# Patient Record
Sex: FEMALE
[De-identification: names, ages, dates, MRNs, and addresses within clinical notes are randomized per-mention and may not be internally consistent; named-entity substitution may affect disease eponyms.]

---

## 2018-02-07 ENCOUNTER — HOSPITAL ENCOUNTER (INPATIENT)
Dept: HOSPITAL 42 - ED | Age: 23
LOS: 2 days | Discharge: HOME | DRG: 387 | End: 2018-02-09
Attending: INTERNAL MEDICINE | Admitting: INTERNAL MEDICINE
Payer: OTHER GOVERNMENT

## 2018-02-07 VITALS — BODY MASS INDEX: 20.7 KG/M2

## 2018-02-07 DIAGNOSIS — D50.9: ICD-10-CM

## 2018-02-07 DIAGNOSIS — Z86.19: ICD-10-CM

## 2018-02-07 DIAGNOSIS — K51.90: Primary | ICD-10-CM

## 2018-02-07 DIAGNOSIS — E03.9: ICD-10-CM

## 2018-02-07 DIAGNOSIS — N83.202: ICD-10-CM

## 2018-02-07 DIAGNOSIS — N83.201: ICD-10-CM

## 2018-02-07 DIAGNOSIS — K64.9: ICD-10-CM

## 2018-02-07 LAB
ALBUMIN SERPL-MCNC: 4.5 G/DL (ref 3–4.8)
ALBUMIN/GLOB SERPL: 1.2 {RATIO} (ref 1.1–1.8)
ALT SERPL-CCNC: 32 U/L (ref 7–56)
APPEARANCE UR: CLEAR
APTT BLD: 22.7 SECONDS (ref 25.1–36.5)
AST SERPL-CCNC: 24 U/L (ref 14–36)
BASOPHILS # BLD AUTO: 0.06 K/MM3 (ref 0–2)
BASOPHILS NFR BLD: 0.8 % (ref 0–3)
BILIRUB UR-MCNC: NEGATIVE MG/DL
BUN SERPL-MCNC: 13 MG/DL (ref 7–21)
CALCIUM SERPL-MCNC: 9.6 MG/DL (ref 8.4–10.5)
COLOR UR: YELLOW
EOSINOPHIL # BLD: 0.6 10*3/UL (ref 0–0.7)
EOSINOPHIL NFR BLD: 7.5 % (ref 1.5–5)
ERYTHROCYTE [DISTWIDTH] IN BLOOD BY AUTOMATED COUNT: 12.3 % (ref 11.5–14.5)
GFR NON-AFRICAN AMERICAN: > 60
GLUCOSE UR STRIP-MCNC: NEGATIVE MG/DL
GRANULOCYTES # BLD: 4.33 10*3/UL (ref 1.4–6.5)
GRANULOCYTES NFR BLD: 55.9 % (ref 50–68)
HGB BLD-MCNC: 13 G/DL (ref 12–16)
INR PPP: 1.04 (ref 0.93–1.08)
LEUKOCYTE ESTERASE UR-ACNC: NEGATIVE LEU/UL
LIPASE SERPL-CCNC: 113 U/L (ref 23–300)
LYMPHOCYTES # BLD: 1.9 10*3/UL (ref 1.2–3.4)
LYMPHOCYTES NFR BLD AUTO: 24.5 % (ref 22–35)
MCH RBC QN AUTO: 29.3 PG (ref 25–35)
MCHC RBC AUTO-ENTMCNC: 32.7 G/DL (ref 31–37)
MCV RBC AUTO: 89.8 FL (ref 80–105)
MONOCYTES # BLD AUTO: 0.9 10*3/UL (ref 0.1–0.6)
MONOCYTES NFR BLD: 11.3 % (ref 1–6)
PH UR STRIP: 6.5 [PH] (ref 4.7–8)
PLATELET # BLD: 354 10^3/UL (ref 120–450)
PMV BLD AUTO: 8.9 FL (ref 7–11)
PROT UR STRIP-MCNC: NEGATIVE MG/DL
PROTHROMBIN TIME: 12 SECONDS (ref 9.4–12.5)
RBC # BLD AUTO: 4.43 10^6/UL (ref 3.5–6.1)
RBC # UR STRIP: NEGATIVE /UL
SP GR UR STRIP: 1.01 (ref 1–1.03)
TROPONIN I SERPL-MCNC: < 0.01 NG/ML
URINE NITRATE: NEGATIVE
UROBILINOGEN UR STRIP-ACNC: 0.2 E.U./DL
WBC # BLD AUTO: 7.7 10^3/UL (ref 4.5–11)

## 2018-02-07 NOTE — CP.PCM.CON
<Kendy Snowden - Last Filed: 02/07/18 16:20>





History of Present Illness





- History of Present Illness


History of Present Illness: 


Gi consult note for Dr Chu





Reason for consult: colitis 





Patient is a 21 y/o female with PMHx of ulcerative colitis diagnosed based on 

colonoscopy back in 2009 presenting with worsening in bloody diarrhea, and 

abdominal pain. GI is being consulted for possible ulcerative colitis flare. 

Patient states since diagnosed she has been on mesalamine po and suppository 

with resolution in bloody bowel movement, but continued to have loose stool. 

Patient states she stopped the mesalamine 6 months ago. Patient currently only 

takes probiotics. Had colonoscopy 3 times after diagnoses with last one being 

in 2014 with unremarkable result. Patient moved from [place] and started 

seeing Dr Wiley recently due to colitis flare.


In addition, patient was diagnosed with C difficile twice back in 2012 and 2014 

and was treated with po flagyl  and vancomycin ( 4x per day for a month). 


Patient decided to come to the ED because for the past couple days patient has 

been having bloody bowel movement , feeling more fatigued and has been 

nauseous. Patient states she has been having fever on/off upward to 101-102.


Currently patient denies nausea, or vomiting. Have not had diarrhea since being 

in the ED.  Denies cough or runny nose. 





PMHx: ulcerative colitis, C difficile


PSHx: EGD and colonoscopy in 2009, 2012 and 2014 in [place]


FMHx: mom and dad are alive and healthy


Social: Denies tobacco, alcohol or illicit drug use. 


Home meds: probiotic, iron


Allergy: codeine





Review of Systems





- Review of Systems


All systems: reviewed and no additional remarkable complaints except


Review of Systems: 





12 point ROS reviewed, all negative except as per HPI. 





Past Patient History





- Past Social History


Smoking Status: Never Smoked


Alcohol: None


Drugs: Denies


Home Situation {Lives}: With Family





- CARDIAC


Hx Cardiac Disorders: No





- PULMONARY


Hx Respiratory Disorders: No





- NEUROLOGICAL


Hx Neurological Disorder: No





- HEENT


Hx HEENT Problems: No





- RENAL


Hx Chronic Kidney Disease: No





- ENDOCRINE/METABOLIC


Hx Endocrine Disorders: No





- HEMATOLOGICAL/ONCOLOGICAL


Hx Blood Disorders: Yes


Hx Anemia: Yes





- INTEGUMENTARY


Hx Dermatological Problems: No





- MUSCULOSKELETAL/RHEUMATOLOGICAL


Hx Musculoskeletal Disorders: No





- GASTROINTESTINAL


Hx Gastrointestinal Disorders: Yes


Hx Colitis: Yes





- GENITOURINARY/GYNECOLOGICAL


Hx Genitourinary Disorders: No





- PSYCHIATRIC


Hx Psychophysiologic Disorder: No


Hx Substance Use: No





- SURGICAL HISTORY


Hx Surgeries: No





Meds


Allergies/Adverse Reactions: 


 Allergies











Allergy/AdvReac Type Severity Reaction Status Date / Time


 


codeine AdvReac  VOMITING Verified 02/07/18 17:07














- Medications


Medications: 


 Current Medications





Hydromorphone HCl (Dilaudid)  0.5 mg IVP Q4H PRN


   PRN Reason: Pain, Mild (1-3)


Pantoprazole Sodium (Protonix Inj)  40 mg IVP DAILY [facility]











Physical Exam





- Constitutional


Appears: No Acute Distress





- Head Exam


Head Exam: ATRAUMATIC, NORMAL INSPECTION, NORMOCEPHALIC





- Eye Exam


Eye Exam: EOMI, Normal appearance, PERRL.  absent: Scleral icterus


Pupil Exam: NORMAL ACCOMODATION





- ENT Exam


ENT Exam: Mucous Membranes Moist





- Neck Exam


Neck exam: Positive for: Normal Inspection.  Negative for: Lymphadenopathy





- Respiratory Exam


Respiratory Exam: Clear to Auscultation Bilateral, NORMAL BREATHING PATTERN.  

absent: Rales, Rhonchi, Wheezes, Respiratory Distress, Stridor





- Cardiovascular Exam


Cardiovascular Exam: REGULAR RHYTHM, RRR, +S1, +S2.  absent: Gallop, JVD, Rubs, 

Systolic Murmur





- GI/Abdominal Exam


GI & Abdominal Exam: Distended (left lower quadrant.), Normal Bowel Sounds, 

Soft.  absent: Firm, Guarding, Organomegaly, Rebound, Rigid, Tenderness





- Extremities Exam


Extremities exam: Positive for: normal inspection.  Negative for: pedal edema





- Back Exam


Back exam: NORMAL INSPECTION





- Neurological Exam


Neurological exam: Alert, Oriented x3





- Psychiatric Exam


Psychiatric exam: Normal Affect, Normal Mood





- Skin


Skin Exam: Dry, Intact, Normal Color, Warm





Results





- Vital Signs


Recent Vital Signs: 


 Last Vital Signs











Temp  98.4 F   02/07/18 09:21


 


Pulse  70   02/07/18 16:02


 


Resp  17   02/07/18 16:02


 


BP  127/65   02/07/18 16:02


 


Pulse Ox  99   02/07/18 16:02














- Labs


Result Diagrams: 


 02/07/18 11:05





 02/07/18 11:05





Assessment & Plan





- Assessment and Plan (Free Text)


Assessment: 


Patient is a 21 y/o female with PMHx of ulcerative colitis diagnosed and c diff 

colitis s/p treatment 2x presenting with worsening in abdominal pain, bloody 

bowel movement and loose stool, along with fever. CT with ovarian cysts.





- Likely ulcerative colitis flare, r/o c diff colitis 





Plan: 


- NPO past midnight


- Gi prep for colonoscopy tomorrow 


- IV hydration


- anti emetics prn


- Stool c diff for toxin and antigen testing


- stool culture. 





Patient seen, examined and case discussed with Dr Chu.





- Date & Time


Date: 02/07/18


Time: 16:10





<Matthias Chu - Last Filed: 02/07/18 18:45>





Meds





- Medications


Medications: 


 Current Medications





Hydromorphone HCl (Dilaudid)  0.5 mg IVP Q4H PRN


   PRN Reason: Pain, Mild (1-3)


Sodium Chloride (Sodium Chloride 0.9%)  1,000 mls @ 150 mls/hr IV .Q6H40M [facility]


   Last Admin: 02/07/18 17:13 Dose:  150 mls/hr


Ondansetron HCl (Zofran Inj)  4 mg IVP Q4H PRN


   PRN Reason: Nausea/Vomiting


Pantoprazole Sodium (Protonix Inj)  40 mg IVP DAILY [facility]











Results





- Vital Signs


Recent Vital Signs: 


 Last Vital Signs











Temp  98.4 F   02/07/18 18:04


 


Pulse  70   02/07/18 18:04


 


Resp  17   02/07/18 18:04


 


BP  127/65   02/07/18 18:04


 


Pulse Ox  99   02/07/18 16:02














- Labs


Result Diagrams: 


 02/07/18 11:05





 02/07/18 11:05





Attending/Attestation





- Attestation


I have personally seen and examined this patient.: Yes


I have fully participated in the care of the patient.: Yes


I have reviewed all pertinent clinical information: Yes


Notes (Text): 





02/07/18 18:44


22 year old female with h/o UC admitted with abdominal pain and bloody diarrhea.


1. Ulcerative colitis


-Plan:


-colonoscopy tomorrow for further eval


-prep tonight, npo after mn


-send stool for cultures/C.diff/O&P


-IV hydration

## 2018-02-07 NOTE — CT
PROCEDURE:  CT Abdomen and Pelvis with contrast



HISTORY:

h/o ulc colitis r/o abscess



COMPARISON:

None.



TECHNIQUE:

Contrast dose: 100 cc of Omni 350



Radiation dose:



Total exam DLP = 265 mGy-cm.



This CT exam was performed using one or more of the following dose 

reduction techniques: Automated exposure control, adjustment of the 

mA and/or kV according to patient size, and/or use of iterative 

reconstruction technique.



FINDINGS:



LOWER THORAX:

Unremarkable. 



LIVER:

Unremarkable. No gross lesion or ductal dilatation. 



GALLBLADDER AND BILE DUCTS:

Unremarkable. 



PANCREAS:

Unremarkable. No gross lesion or ductal dilatation.



SPLEEN:

Unremarkable. 



ADRENALS:

Unremarkable. No mass. 



KIDNEYS AND URETERS:

Unremarkable. No hydronephrosis. No solid mass. 



VASCULATURE:

Unremarkable. No aortic aneurysm. 



BOWEL:

Unremarkable. No obstruction. No gross mural thickening. 



APPENDIX:

Normal appendix. 



PERITONEUM:

Unremarkable. No free fluid. No free air. 



LYMPH NODES:

Unremarkable. No enlarged lymph nodes. 



BLADDER:

Unremarkable. 



REPRODUCTIVE:

Bilateral ovarian cysts are seen.  There is a thick-walled enhancing 

rim on the left which may represent a recent cyst rupture. There is 

minimal fluid in the cul-de-sac 



BONES:

No acute fracture. 



OTHER FINDINGS:

None.



IMPRESSION:

Bilateral ovarian cysts are seen.  There is a thick-walled enhancing 

rim on the left which may represent a recent cyst rupture. There is 

minimal fluid in the cul-de-sac

## 2018-02-07 NOTE — ED PDOC
Arrival/HPI





- General


Chief Complaint: GI Problem


Time Seen by Provider: 02/07/18 09:37


Historian: Patient, Partner





- History of Present Illness


Narrative History of Present Illness (Text): 


02/07/18 09:40


A 22 year old female, whose past medical history includes ulcer colitis, 

hemorrhoids, and anemia, presents to the emergency department complaining of 

intermittent abdominal pain and bloody stool. Patient reports experiencing 

symptoms for few weeks. States stools are sometimes completely bloody, 

sometimes normal. Patient mentions experiencing fever few weeks ago prior to 

symptoms. Notes also experiencing dizziness, lightheadedness, nausea, lower 

abdominal pain, shortness of breath (mostly when walking), and bad odor to 

urine. Denies any vomiting, rhinorrhea, cough, or any other complaints at this 

time. 





PMD: Dr. Green


GI: Dr. Mcneill





Past Medical History





- Provider Review


Nursing Documentation Reviewed: Yes





- Cardiac


Hx Cardiac Disorders: No





- Pulmonary


Hx Respiratory Disorders: No





- Neurological


Hx Neurological Disorder: No





- HEENT


Hx HEENT Disorder: No





- Renal


Hx Renal Disorder: No





- Endocrine/Metabolic


Hx Endocrine Disorders: No





- Hematological/Oncological


Hx Blood Disorders: Yes


Hx Anemia: Yes





- Integumentary


Hx Dermatological Disorder: No





- Musculoskeletal/Rheumatological


Hx Musculoskeletal Disorders: No





- Gastrointestinal


Hx Gastrointestinal Disorders: Yes


Hx Colitis: Yes





- Genitourinary/Gynecological


Hx Genitourinary Disorders: No





- Psychiatric


Hx Psychophysiologic Disorder: No


Hx Substance Use: No





Family/Social History





- Physician Review


Nursing Documentation Reviewed: Yes


Family/Social History: No Known Family HX


Smoking Status: Never Smoked


Hx Alcohol Use: Yes


Frequency of alcohol use: Socially


Hx Substance Use: No





Allergies/Home Meds


Allergies/Adverse Reactions: 


Allergies





codeine Adverse Reaction (Verified 02/07/18 17:07)


 VOMITING








Home Medications: 


 Home Meds











 Medication  Instructions  Recorded  Confirmed


 


No Known Home Med  02/07/18 02/07/18














Review of Systems





- Physician Review


All systems were reviewed & negative as marked: Yes





- Review of Systems


ENT: absent: Rhinorrhea


Respiratory: SOB (mostly when walking).  absent: Cough


Gastrointestinal: Abdominal Pain (intermittently), Stool Changes (bloody stool (

sometimes)), Nausea.  absent: Vomiting


Genitourinary Female: Other (bad odor to urine)


Neurological: Dizziness





Physical Exam


Vital Signs Reviewed: Yes


Vital Signs











  Temp Pulse Resp BP Pulse Ox


 


 02/07/18 16:02   70  17  127/65  99


 


 02/07/18 13:40   74  18  128/64  100


 


 02/07/18 12:37   75  18  132/65  100


 


 02/07/18 11:00   78  18  134/69  100


 


 02/07/18 10:35   93 H  17  118/62  99


 


 02/07/18 09:21  98.4 F  81  16  136/71  100











Temperature: Afebrile


Blood Pressure: Normal


Pulse: Regular


Respiratory Rate: Normal


Appearance: Positive for: Well-Appearing


Pain Distress: None


Mental Status: Positive for: Alert and Oriented X 3





- Systems Exam


Head: Present: Atraumatic, Normocephalic


Pupils: Present: PERRL


Extroacular Muscles: Present: EOMI


Conjunctiva: Present: Normal


Mouth: Present: Moist Mucous Membranes


Neck: Present: Normal Range of Motion


Respiratory/Chest: Present: Clear to Auscultation, Good Air Exchange.  No: 

Respiratory Distress, Accessory Muscle Use


Cardiovascular: Present: Regular Rate and Rhythm, Normal S1, S2.  No: Murmurs


Abdomen: Present: Tenderness (left side of abdomen, more so upper than lower 

region)


Rectal: Present: Hemorrhoids (external non-thrombus hemorrhoid), Other (brown 

stool, guaiac positive. Chaperoned by Deepa Antunez.)


Back: Present: Normal Inspection


Upper Extremity: Present: Normal Inspection.  No: Cyanosis, Edema


Lower Extremity: Present: Normal Inspection.  No: Edema


Neurological: Present: GCS=15, CN II-XII Intact, Speech Normal


Skin: Present: Pale


Psychiatric: Present: Alert, Oriented x 3, Normal Insight, Normal Concentration





Medical Decision Making


ED Course and Treatment: 


02/07/18 09:45


Impression: 22 year old female with intermittent abdominal pain and bloody 

stool. Physical exam shows tenderness to left abdomen (more so on upper than 

lower region); rectal exam, chaperoned by Deepa Antunez, shows guaiac 

positive, brown stool, external non-thrombus hemorrhoid..





Differential Diagnosis included but are not limited to:  Ulcer Colitis 

Exacerbation vs. Abscess





Plan:


-- Abd/Pelvis CT


-- Labs


-- Morphine


-- Zofran


-- Protonix


-- Urinalysis 


-- Reassess and disposition





Progress Notes:


02/07/2018 12:28


Abd/Pelvis CT


IMPRESSION: 


Bilateral ovarian cysts are seen. There is a thick-walled enhancing rim on the 

the left which may represent a recent cyst rupture. There is minimal fluid in 

the cul-de-sac.


Dictator: Hernan Sagastume MD





02/07/18 14:33


Case discussed with Dr. Bell and patient's GI doctor, Dr. Mcneill, who 

recommends patient be admitted for colonoscopy tomorrow.








- Lab Interpretations


Lab Results: 








 02/07/18 11:05 





 02/07/18 11:05 





 Lab Results





02/07/18 12:45: Blood Type Confirm A POSITIVE


02/07/18 11:05: Sodium 140, Potassium 4.2, Chloride 103, Carbon Dioxide 23, 

Anion Gap 18, BUN 13, Creatinine 0.7, Est GFR (African Amer) > 60, Est GFR (Non-

Af Amer) > 60, Random Glucose 87, Calcium 9.6, Total Bilirubin 0.8, AST 24, ALT 

32, Alkaline Phosphatase 96, Lactate Dehydrogenase 422, Total Creatine Kinase 56

, Troponin I < 0.01, Total Protein 8.3, Albumin 4.5, Globulin 3.8, Albumin/

Globulin Ratio 1.2, Lipase 113


02/07/18 11:05: Urine Color Yellow, Urine Appearance Clear, Urine pH 6.5, Ur 

Specific Gravity 1.010, Urine Protein Negative, Urine Glucose (UA) Negative, 

Urine Ketones Negative, Urine Blood Negative, Urine Nitrate Negative, Urine 

Bilirubin Negative, Urine Urobilinogen 0.2, Ur Leukocyte Esterase Negative


02/07/18 11:05: PT 12.0, INR 1.04, APTT 22.7 L


02/07/18 11:05: WBC 7.7, RBC 4.43, Hgb 13.0, Hct 39.8, MCV 89.8, MCH 29.3, MCHC 

32.7, RDW 12.3, Plt Count 354, MPV 8.9, Gran % 55.9, Lymph % (Auto) 24.5, Mono 

% (Auto) 11.3 H, Eos % (Auto) 7.5 H, Baso % (Auto) 0.8, Gran # 4.33, Lymph # (

Auto) 1.9, Mono # (Auto) 0.9 H, Eos # (Auto) 0.6, Baso # (Auto) 0.06


02/07/18 10:50: Blood Type A POSITIVE, Antibody Screen Negative, BBK History 

Checked No verified bt








I have reviewed the lab results: Yes





- RAD Interpretation


Radiology Orders: 








02/07/18 09:45


ABD PELVIS PO & IV CONTRAST [CT] Stat 














- Medication Orders


Current Medication Orders: 








Sodium Chloride (Sodium Chloride 0.9%)  1,000 mls @ 150 mls/hr IV .Q6H40M [facility]


   Last Admin: 02/07/18 17:13  Dose: 150 mls/hr





eMAR Start Stop


 Document     02/07/18 17:13  EP  (Rec: 02/07/18 17:14  EP  [facility] [id number])


     Intravenous Solution


      Start Date                                 02/07/18


      Start Time                                 17:13





Metronidazole (Flagyl)  500 mg in 100 mls @ 100 mls/hr IVPB Q8 [facility]


   PRN Reason: Protocol


Ceftriaxone Sodium (Rocephin 1 Gram Ivpb)  1 gm in 100 mls @ 100 mls/hr IVPB 

DAILY [facility]


   PRN Reason: Protocol


   Last Admin: 02/08/18 10:41  Dose: 100 mls/hr





eMAR Start Stop


 Document     02/08/18 10:41  CV  (Rec: 02/08/18 10:41  [place]  [id number]


     Intravenous Solution


      Start Date                                 02/08/18


      Start Time                                 10:41





Sodium Chloride (Sodium Chloride 0.9%)  1,000 mls @ 100 mls/hr IV .Q10H [facility]


Mesalamine (Rowasa Enema)  4 gm RC BID [facility]


Mesalamine (Asacol Hd 800mg)  1,600 mg PO TID [facility]


Ondansetron HCl (Zofran Inj)  4 mg IVP Q4H PRN


   PRN Reason: Nausea/Vomiting


Pantoprazole Sodium (Protonix Inj)  40 mg IVP DAILY [facility]


   Last Admin: 02/08/18 10:42  Dose: 40 mg





IVP Administration


 Document     02/08/18 10:42  CV  (Rec: 02/08/18 10:42  [place]  [id number]


     Charges for Administration


      # of IVP Administrations                   1








Discontinued Medications





Hydromorphone HCl (Dilaudid)  0.5 mg IVP Q4H PRN


   PRN Reason: Pain, Mild (1-3)


Morphine Sulfate (Morphine)  2 mg IVP STAT STA


   Stop: 02/07/18 09:47


   Last Admin: 02/07/18 10:40  Dose: 2 mg





MAR Pain Assessment


 Document     02/07/18 10:40  SF  (Rec: 02/07/18 10:51  SF  [id number]


     Pain Reassessment


      Is this a pain reassessment?               Yes


     Sleep


      Is patient sleeping during reassessment?   No


IVP Administration


 Document     02/07/18 10:40  SF  (Rec: 02/07/18 10:51  SF  [id number]


     Charges for Administration


      # of IVP Administrations                   1





Ondansetron HCl (Zofran Inj)  4 mg IVP STAT STA


   Stop: 02/07/18 09:45


   Last Admin: 02/07/18 10:40  Dose: 4 mg





IVP Administration


 Document     02/07/18 10:40  SF  (Rec: 02/07/18 10:51  SF  [id number])


     Charges for Administration


      # of IVP Administrations                   1





Pantoprazole Sodium (Protonix Inj)  80 mg IVP STAT STA


   Stop: 02/07/18 09:47


   Last Admin: 02/07/18 10:40  Dose: 80 mg





IVP Administration


 Document     02/07/18 10:40  SF  (Rec: 02/07/18 10:52  SF  [id number])


     Charges for Administration


      # of IVP Administrations                   1





Pneumococcal Polyvalent Vaccine (Pneumovax 23 Vaccine)  0.5 ml IM .ONCE ONE


   Stop: 02/07/18 18:26


Polyethylene Glycol/Electrolytes (Golytely)  4,000 ml PO ONCE ONE


   Stop: 02/07/18 16:18


   Last Admin: 02/07/18 17:14  Dose: 4,000 ml











- Scribe Statement


The provider has reviewed the documentation as recorded by the Deepa Juárez





Provider Scribe Attestation:


All medical record entries made by the Jonnyibcailin were at my direction and 

personally dictated by me. I have reviewed the chart and agree that the record 

accurately reflects my personal performance of the history, physical exam, 

medical decision making, and the department course for this patient. I have 

also personally directed, reviewed, and agree with the discharge instructions 

and disposition.








Disposition/Present on Arrival





- Present on Arrival


Any Indicators Present on Arrival: No


History of DVT/PE: No


History of Uncontrolled Diabetes: No


Urinary Catheter: No


History of Decub. Ulcer: No


History Surgical Site Infection Following: None





- Disposition


Have Diagnosis and Disposition been Completed?: Yes


Diagnosis: 


 Ulcerative colitis





Disposition: HOSPITALIZED


Disposition Time: 14:32


Patient Plan: Admission


Condition: FAIR

## 2018-02-08 VITALS — RESPIRATION RATE: 18 BRPM

## 2018-02-08 LAB
% IRON SATURATION: 17 % (ref 20–55)
ALBUMIN SERPL-MCNC: 3.7 G/DL (ref 3–4.8)
ALBUMIN/GLOB SERPL: 1.1 {RATIO} (ref 1.1–1.8)
ALT SERPL-CCNC: 23 U/L (ref 7–56)
AST SERPL-CCNC: 20 U/L (ref 14–36)
BUN SERPL-MCNC: 6 MG/DL (ref 7–21)
CALCIUM SERPL-MCNC: 8.4 MG/DL (ref 8.4–10.5)
ERYTHROCYTE [DISTWIDTH] IN BLOOD BY AUTOMATED COUNT: 12.4 % (ref 11.5–14.5)
FOLATE SERPL-MCNC: 7 NG/ML
GFR NON-AFRICAN AMERICAN: > 60
HDLC SERPL-MCNC: 39 MG/DL (ref 29–60)
HGB BLD-MCNC: 10.3 G/DL (ref 12–16)
IRON SERPL-MCNC: 56 UG/DL (ref 45–180)
LDLC SERPL-MCNC: 61 MG/DL (ref 0–129)
MCH RBC QN AUTO: 28 PG (ref 25–35)
MCHC RBC AUTO-ENTMCNC: 30.7 G/DL (ref 31–37)
MCV RBC AUTO: 91 FL (ref 80–105)
PLATELET # BLD: 285 10^3/UL (ref 120–450)
PMV BLD AUTO: 8.5 FL (ref 7–11)
RBC # BLD AUTO: 3.68 10^6/UL (ref 3.5–6.1)
TIBC SERPL-MCNC: 333 UG/DL (ref 265–497)
VIT B12 SERPL-MCNC: 498 PG/ML (ref 239–931)
WBC # BLD AUTO: 5.4 10^3/UL (ref 4.5–11)

## 2018-02-08 PROCEDURE — 0DBM8ZX EXCISION OF DESCENDING COLON, VIA NATURAL OR ARTIFICIAL OPENING ENDOSCOPIC, DIAGNOSTIC: ICD-10-PCS

## 2018-02-08 PROCEDURE — 0DBL8ZX EXCISION OF TRANSVERSE COLON, VIA NATURAL OR ARTIFICIAL OPENING ENDOSCOPIC, DIAGNOSTIC: ICD-10-PCS

## 2018-02-08 PROCEDURE — 0DBK8ZX EXCISION OF ASCENDING COLON, VIA NATURAL OR ARTIFICIAL OPENING ENDOSCOPIC, DIAGNOSTIC: ICD-10-PCS

## 2018-02-08 RX ADMIN — CEFTRIAXONE SCH MLS/HR: 1 INJECTION, SOLUTION INTRAVENOUS at 10:41

## 2018-02-08 RX ADMIN — METRONIDAZOLE SCH MLS/HR: 500 INJECTION, SOLUTION INTRAVENOUS at 14:55

## 2018-02-08 RX ADMIN — MESALAMINE SCH MG: 800 TABLET, DELAYED RELEASE ORAL at 14:55

## 2018-02-08 RX ADMIN — METRONIDAZOLE SCH MLS/HR: 500 INJECTION, SOLUTION INTRAVENOUS at 21:37

## 2018-02-08 RX ADMIN — MESALAMINE SCH MG: 800 TABLET, DELAYED RELEASE ORAL at 18:50

## 2018-02-08 NOTE — HP
CHIEF COMPLAINT:  Abdominal pain, feverish feeling, blood in the stool.



HISTORY OF PRESENT ILLNESS:  Ms. Mony Rodrigez is a 22-year-old female with

past medical history of ulcerative colitis, hemorrhoids, anemia, came to

the Emergency Department complaining of intermittent abdominal pain and

bloody stool.  The patient reports the pain and symptoms present for a few

weeks.  The patient's stools are sometimes completely bloody and sometimes

normal.  The patient manifests experience of fever a few weeks ago prior to

the symptoms, noticed fatigue, dizziness, lightheadedness, nausea, lower

abdominal pain, shortness of breath mostly when walking and bad odor in the

urine.  Denies nausea, vomiting, rhinorrhea, cough.



PAST MEDICAL HISTORY:  As above.  History of ulcerative colitis,

hemorrhoids, anemia, colitis.



FAMILY HISTORY:  Father and mother, noncontributory.



HABITS:  Never smoked.  No drug, no ethanol.



ALLERGIES:  WITH CODEINE.



HOME MEDICATIONS:  Unknown.



REVIEW OF SYSTEMS:  The patient seen and examined on the bedside in her

room, drinking _____, still has the abdominal pain.  No rhinorrhea. 

Shortness of breath when walking.  No coughing.  Abdominal pain is

intermittent.  Stool is bloody.  No nausea or vomiting now.  Bad odor of

the urine.  Dizziness.



PHYSICAL EXAMINATION:

VITAL SIGNS:  Temperature 98.4, pulse 81, respiratory rate 16, blood

pressure 138/71, pulse oximetry is 100.

HEENT:  Head is normocephalic and atraumatic.  Eyes:  PERRLA.  Extraocular

muscles are intact.  Conjunctivae are clear.  Nose is patent.  Mucous

membranes are moist.

NECK:  Supple.  No carotid bruits, JVD or thyromegaly.

CHEST:  Bilaterally symmetrical.

HEART:  S1 and S2 positive.

LUNGS:  Clear to auscultation.

ABDOMEN:  Soft.  Bowel sounds present.  No organomegaly.

EXTREMITIES:  No edema.  No cyanosis.

NEUROLOGIC:  The patient is awake and alert.  Moving all four extremities. 

No focal deficits.



LABORATORY DATA:  White blood cells 7.7, hemoglobin 13.0, hematocrit 39.8,

platelets 354.  Sodium 140, potassium 4.2, BUN 13, creatinine 0.7, glucose

87.



ASSESSMENT AND PLAN:  History of colitis, hemorrhoids, anemia, history of

Clostridium difficile toxin colitis, ulcerative colitis was diagnosed with

colonoscopy back in 2009, came with worsening blood in the diarrhea, rule

out ulcerative colitis flare up.  As per patient, the patient is on

mesalamine p.o. and suppositories with resolution in bloody bowel movement,

but continues to have loose stool.  Patient was told to stop mesalamine six

months ago.  Now, patient is only taking probiotic, had colonoscopy three

times after diagnosis with last one in 2014, which was unremarkable. 

Patient moved to [place], started seeing Dr. Mcneill recently due to

colitis flare up, seen by Dr. Matthias Chu.  Patient is going for

colonoscopy tomorrow, prep patient tonight, n.p.o. after midnight.  Send

the stool for culture, Clostridium difficile, ova and parasite, and give

intravenous hydration.  We will call Infectious Disease consult also for

feverish feeling with repeat labs.  Urinalysis is within normal limits, but

as per patient, there is bad smell in the urine.  Electrolytes are within

normal limits.  No anemia.  Gastrointestinal and deep vein thrombosis

prophylaxis.  Repeat albs.  We will follow up.





__________________________________________

Patricia Bell MD



DD:  02/07/2018 22:33:42

DT:  02/07/2018 23:47:58

Job # [id number]

## 2018-02-08 NOTE — PN
DATE:



SUBJECTIVE:  The patient is seen and examined on the bedside, looking

comfortable.  Went for colonoscopy by Dr. Chu.  No fever.  No chills.  No

nausea, vomiting, diarrhea.  No hematuria.  No hematochezia.  No swelling

of the leg.  No chest pain.  No palpitation.  No headache.  No dizziness.



PHYSICAL EXAMINATION:

VITAL SIGNS:  Temperature 97.6, pulse 91, blood pressure 105/63,

respiratory rate 18.

HEENT:  Head normocephalic, atraumatic.  Eyes PERRLA.  Extraocular muscles

intact.  Conjunctivae clear.  Nose patent.

NECK:  Supple.  No carotid bruit.  No JVD or thyromegaly.

CHEST:  Bilaterally symmetrical.

HEART:  S1 and S2 positive.

LUNGS:  Clear to auscultation.

ABDOMEN:  Soft.  Bowel sounds positive.  No organomegaly.

EXTREMITIES:  No edema.  No cyanosis.

NEUROLOGICAL:  The patient is awake and alert.  Moving all 4 extremities. 

No focal deficits.



LABORATORY DATA:  White blood cells 5.4, hemoglobin 10.3, hematocrit 33.5,

platelets 285.  Sodium 143, potassium 3.9, BUN 6, creatinine 0.6, glucose

89, iron saturation 17, TSH 5.44.



MEDICATIONS:  Asacol, Flagyl, Protonix, Rocephin, mesalamine NS, Zofran.



ASSESSMENT AND PLAN:  Ms. Mony Rodrigez, 22-year-old female with anemia,

iron deficiency, hypothyroidism, history obstructive colitis, Clostridium

difficile toxin colitis, came with gastrointestinal bleeding, went for

colonoscopy, history of abdominal pain and bloody diarrhea.  Rule out

ulcerative colitis flare-up.  The patient is also having feverish feeling. 

Infectious Disease consult called.  Started on antibiotics.  We also has

limited colonoscopy with biopsy.  Waiting for the findings.  We will

continue current treatment.  Repeat labs.  We will follow up.



__________________________________________

Patricia Bell MD





DD:  02/08/2018 21:41:05

DT:  02/08/2018 22:02:51

Job # [id number]

## 2018-02-08 NOTE — CP.PCM.PCO
Physician Communication Note





- Physician Communication Note


Physician Communication Note: went for colonoscopy, started abx, will see pt 

tomorrow

## 2018-02-09 VITALS
TEMPERATURE: 98.5 F | HEART RATE: 81 BPM | SYSTOLIC BLOOD PRESSURE: 102 MMHG | DIASTOLIC BLOOD PRESSURE: 56 MMHG | OXYGEN SATURATION: 99 %

## 2018-02-09 LAB
BUN SERPL-MCNC: 3 MG/DL (ref 7–21)
CALCIUM SERPL-MCNC: 8.3 MG/DL (ref 8.4–10.5)
ERYTHROCYTE [DISTWIDTH] IN BLOOD BY AUTOMATED COUNT: 12.3 % (ref 11.5–14.5)
GFR NON-AFRICAN AMERICAN: > 60
HGB BLD-MCNC: 10.4 G/DL (ref 12–16)
MCH RBC QN AUTO: 28.4 PG (ref 25–35)
MCHC RBC AUTO-ENTMCNC: 31.5 G/DL (ref 31–37)
MCV RBC AUTO: 90.2 FL (ref 80–105)
PLATELET # BLD: 270 10^3/UL (ref 120–450)
PMV BLD AUTO: 8.6 FL (ref 7–11)
RBC # BLD AUTO: 3.66 10^6/UL (ref 3.5–6.1)
WBC # BLD AUTO: 6.1 10^3/UL (ref 4.5–11)

## 2018-02-09 RX ADMIN — METRONIDAZOLE SCH MLS/HR: 500 INJECTION, SOLUTION INTRAVENOUS at 06:12

## 2018-02-09 RX ADMIN — MESALAMINE SCH MG: 800 TABLET, DELAYED RELEASE ORAL at 10:31

## 2018-02-09 RX ADMIN — CEFTRIAXONE SCH MLS/HR: 1 INJECTION, SOLUTION INTRAVENOUS at 10:32

## 2018-02-09 NOTE — CP.PCM.DIS
<DarienJoselyn Janee - Last Filed: 02/09/18 14:46>





Provider





- Provider


Date of Admission: 


02/07/18 14:32





Attending physician: 


Patricia Bell MD





Primary care physician: 


Patricia Bell MD





Consults: 





GI - Dr. Mcneill


ID - Dr. Soni





Time Spent in preparation of Discharge (in minutes): 40





Diagnosis





- Discharge Diagnosis


(1) Anemia


Status: Acute   





Hospital Course





- Lab Results


Lab Results: 


 Micro Results





02/08/18 13:15   Stool   C. difficile Antigen & Toxin A,B (M - Final





 Most Recent Lab Values











WBC  6.1 10^3/ul (4.5-11.0)   02/09/18  06:30    


 


RBC  3.66 10^6/uL (3.5-6.1)   02/09/18  06:30    


 


Hgb  10.4 g/dL (12.0-16.0)  L  02/09/18  06:30    


 


Hct  33.0 % (36.0-48.0)  L  02/09/18  06:30    


 


MCV  90.2 fl (80.0-105.0)   02/09/18  06:30    


 


MCH  28.4 pg (25.0-35.0)   02/09/18  06:30    


 


MCHC  31.5 g/dl (31.0-37.0)   02/09/18  06:30    


 


RDW  12.3 % (11.5-14.5)   02/09/18  06:30    


 


Plt Count  270 10^3/uL (120.0-450.0)   02/09/18  06:30    


 


MPV  8.6 fl (7.0-11.0)   02/09/18  06:30    


 


Gran %  55.9 % (50.0-68.0)   02/07/18  11:05    


 


Lymph % (Auto)  24.5 % (22.0-35.0)   02/07/18  11:05    


 


Mono % (Auto)  11.3 % (1.0-6.0)  H  02/07/18  11:05    


 


Eos % (Auto)  7.5 % (1.5-5.0)  H  02/07/18  11:05    


 


Baso % (Auto)  0.8 % (0.0-3.0)   02/07/18  11:05    


 


Gran #  4.33  (1.4-6.5)   02/07/18  11:05    


 


Lymph # (Auto)  1.9  (1.2-3.4)   02/07/18  11:05    


 


Mono # (Auto)  0.9  (0.1-0.6)  H  02/07/18  11:05    


 


Eos # (Auto)  0.6  (0.0-0.7)   02/07/18  11:05    


 


Baso # (Auto)  0.06 K/mm3 (0.0-2.0)   02/07/18  11:05    


 


PT  12.0 SECONDS (9.4-12.5)   02/07/18  11:05    


 


INR  1.04  (0.93-1.08)   02/07/18  11:05    


 


APTT  22.7 Seconds (25.1-36.5)  L  02/07/18  11:05    


 


Sodium  141 mmol/L (132-148)   02/09/18  06:30    


 


Potassium  3.6 mmol/L (3.6-5.0)   02/09/18  06:30    


 


Chloride  110 mmol/L ()  H  02/09/18  06:30    


 


Carbon Dioxide  22 mmol/L (21-33)   02/09/18  06:30    


 


Anion Gap  13  (10-20)   02/09/18  06:30    


 


BUN  3 mg/dL (7-21)  L  02/09/18  06:30    


 


Creatinine  0.7 mg/dl (0.7-1.2)   02/09/18  06:30    


 


Est GFR ( Amer)  > 60   02/09/18  06:30    


 


Est GFR (Non-Af Amer)  > 60   02/09/18  06:30    


 


Random Glucose  85 mg/dL ()   02/09/18  06:30    


 


Hemoglobin A1c  5.2 % (4.2-6.5)   02/08/18  06:30    


 


Calcium  8.3 mg/dL (8.4-10.5)  L  02/09/18  06:30    


 


Iron  56 ug/dL ()   02/08/18  06:30    


 


TIBC  333 ug/dL (265-497)   02/08/18  06:30    


 


% Saturation  17 % (20-55)  L  02/08/18  06:30    


 


Total Bilirubin  0.8 mg/dL (0.2-1.3)   02/08/18  06:30    


 


AST  20 U/L (14-36)   02/08/18  06:30    


 


ALT  23 U/L (7-56)   02/08/18  06:30    


 


Alkaline Phosphatase  75 U/L ()   02/08/18  06:30    


 


Lactate Dehydrogenase  422 U/L (333-699)   02/07/18  11:05    


 


Total Creatine Kinase  56 U/L ()   02/07/18  11:05    


 


Troponin I  < 0.01 ng/mL  02/07/18  11:05    


 


Total Protein  7.0 g/dL (5.8-8.3)   02/08/18  06:30    


 


Albumin  3.7 g/dL (3.0-4.8)   02/08/18  06:30    


 


Globulin  3.4 gm/dL  02/08/18  06:30    


 


Albumin/Globulin Ratio  1.1  (1.1-1.8)   02/08/18  06:30    


 


Triglycerides  58 mg/dL ()   02/08/18  06:30    


 


Cholesterol  125 mg/dL (130-200)  L  02/08/18  06:30    


 


LDL Cholesterol Direct  61 mg/dL (0-129)   02/08/18  06:30    


 


HDL Cholesterol  39 mg/dL (29-60)   02/08/18  06:30    


 


Lipase  113 U/L ()   02/07/18  11:05    


 


Vitamin B12  498 pg/mL (239-931)   02/08/18  06:30    


 


Folate  7.0 ng/mL  02/08/18  06:30    


 


TSH 3rd Generation  5.44 mIU/mL (0.46-4.68)  H  02/08/18  06:30    


 


Urine Color  Yellow  (YELLOW)   02/07/18  11:05    


 


Urine Appearance  Clear  (CLEAR)   02/07/18  11:05    


 


Urine pH  6.5  (4.7-8.0)   02/07/18  11:05    


 


Ur Specific Gravity  1.010  (1.005-1.035)   02/07/18  11:05    


 


Urine Protein  Negative mg/dL (<30 mg/dL)   02/07/18  11:05    


 


Urine Glucose (UA)  Negative mg/dL (NEGATIVE)   02/07/18  11:05    


 


Urine Ketones  Negative mg/dL (NEGATIVE)   02/07/18  11:05    


 


Urine Blood  Negative  (NEGATIVE)   02/07/18  11:05    


 


Urine Nitrate  Negative  (NEGATIVE)   02/07/18  11:05    


 


Urine Bilirubin  Negative  (NEGATIVE)   02/07/18  11:05    


 


Urine Urobilinogen  0.2 E.U./dL (<1 E.U./dL)   02/07/18  11:05    


 


Ur Leukocyte Esterase  Negative Jnaice/uL (NEGATIVE)   02/07/18  11:05    


 


Blood Type  A POSITIVE   02/07/18  10:50    


 


Blood Type Confirm  A POSITIVE   02/07/18  12:45    


 


Antibody Screen  Negative   02/07/18  10:50    


 


BBK History Checked  No verified bt   02/07/18  10:50    














- Hospital Course


Hospital Course: 





22 yr  female w/ history of ulcerative colitis, hemorrhoids, and 

anemia. Pt was admitted in [facility] [place] [facility] following an episode of bloody stools. Pt was 

followed by GI Dr. Mcneill. Colonscopy done with biopsies performed. IV 

treatment with IV flagyl/ceftriaxone per Infectious Disease. Continue 

outpatient treatment with prescribed Asacol & Rowasa. Pt cleared for discharge 

and should follow up in our office in 1 week for continuity of care. 





Reviewed:


CT abd/pelvis = Bilatera ovarian cysts, L ovary possible recent cyst rupture, 

minimal fluid in cul-de-sac


Colonscopy = biopsy taken from descending, transverse and ascending colon





- Date & Time of H&P


Date of H&P: 02/09/18


Time of H&P: 09:40





Discharge Exam





- Head Exam


Head Exam: ATRAUMATIC, NORMOCEPHALIC





- Eye Exam


Eye Exam: EOMI, Normal appearance, PERRL


Pupil Exam: NORMAL ACCOMODATION, PERRL





- ENT Exam


ENT Exam: Mucous Membranes Moist





- Neck Exam


Neck exam: Full Rom





- Respiratory Exam


Respiratory Exam: Clear to PA & Lateral, NORMAL BREATHING PATTERN, UNREMARKABLE





- Cardiovascular Exam


Cardiovascular Exam: +S1, +S2





- GI/Abdominal Exam


GI & Abdominal Exam: Normal Bowel Sounds, Soft, Unremarkable





- Back Exam


Back exam: FULL ROM





- Neurological Exam


Neurological exam: Alert, CN II-XII Intact, Oriented x3





- Psychiatric Exam


Psychiatric exam: Normal Affect, Normal Mood





- Skin


Skin Exam: Dry, Intact, Normal Color, Warm





Discharge Plan





- Follow Up Plan


Condition: FAIR


Disposition: HOME/ ROUTINE


Instructions:  Pneumococcal Vaccine for Adults (GEN), Influenza Vaccine (GEN), 

Regular Diet (DC), Ulcerative Colitis (GEN)


Referrals: 


Patricia Bell MD [Primary Care Provider] - 





<Patricia Bell - Last Filed: 02/10/18 00:14>





Provider





- Provider


Date of Admission: 


02/08/18 21:42





Attending physician: 


Patricia Bell MD





Primary care physician: 


Patricia Bell MD








Hospital Course





- Lab Results


Lab Results: 


 Most Recent Lab Values











WBC  6.1 10^3/ul (4.5-11.0)   02/09/18  06:30    


 


RBC  3.66 10^6/uL (3.5-6.1)   02/09/18  06:30    


 


Hgb  10.4 g/dL (12.0-16.0)  L  02/09/18  06:30    


 


Hct  33.0 % (36.0-48.0)  L  02/09/18  06:30    


 


MCV  90.2 fl (80.0-105.0)   02/09/18  06:30    


 


MCH  28.4 pg (25.0-35.0)   02/09/18  06:30    


 


MCHC  31.5 g/dl (31.0-37.0)   02/09/18  06:30    


 


RDW  12.3 % (11.5-14.5)   02/09/18  06:30    


 


Plt Count  270 10^3/uL (120.0-450.0)   02/09/18  06:30    


 


MPV  8.6 fl (7.0-11.0)   02/09/18  06:30    


 


Gran %  55.9 % (50.0-68.0)   02/07/18  11:05    


 


Lymph % (Auto)  24.5 % (22.0-35.0)   02/07/18  11:05    


 


Mono % (Auto)  11.3 % (1.0-6.0)  H  02/07/18  11:05    


 


Eos % (Auto)  7.5 % (1.5-5.0)  H  02/07/18  11:05    


 


Baso % (Auto)  0.8 % (0.0-3.0)   02/07/18  11:05    


 


Gran #  4.33  (1.4-6.5)   02/07/18  11:05    


 


Lymph # (Auto)  1.9  (1.2-3.4)   02/07/18  11:05    


 


Mono # (Auto)  0.9  (0.1-0.6)  H  02/07/18  11:05    


 


Eos # (Auto)  0.6  (0.0-0.7)   02/07/18  11:05    


 


Baso # (Auto)  0.06 K/mm3 (0.0-2.0)   02/07/18  11:05    


 


PT  12.0 SECONDS (9.4-12.5)   02/07/18  11:05    


 


INR  1.04  (0.93-1.08)   02/07/18  11:05    


 


APTT  22.7 Seconds (25.1-36.5)  L  02/07/18  11:05    


 


Sodium  141 mmol/L (132-148)   02/09/18  06:30    


 


Potassium  3.6 mmol/L (3.6-5.0)   02/09/18  06:30    


 


Chloride  110 mmol/L ()  H  02/09/18  06:30    


 


Carbon Dioxide  22 mmol/L (21-33)   02/09/18  06:30    


 


Anion Gap  13  (10-20)   02/09/18  06:30    


 


BUN  3 mg/dL (7-21)  L  02/09/18  06:30    


 


Creatinine  0.7 mg/dl (0.7-1.2)   02/09/18  06:30    


 


Est GFR ( Amer)  > 60   02/09/18  06:30    


 


Est GFR (Non-Af Amer)  > 60   02/09/18  06:30    


 


Random Glucose  85 mg/dL ()   02/09/18  06:30    


 


Hemoglobin A1c  5.2 % (4.2-6.5)   02/08/18  06:30    


 


Calcium  8.3 mg/dL (8.4-10.5)  L  02/09/18  06:30    


 


Iron  56 ug/dL ()   02/08/18  06:30    


 


TIBC  333 ug/dL (265-497)   02/08/18  06:30    


 


% Saturation  17 % (20-55)  L  02/08/18  06:30    


 


Total Bilirubin  0.8 mg/dL (0.2-1.3)   02/08/18  06:30    


 


AST  20 U/L (14-36)   02/08/18  06:30    


 


ALT  23 U/L (7-56)   02/08/18  06:30    


 


Alkaline Phosphatase  75 U/L ()   02/08/18  06:30    


 


Lactate Dehydrogenase  422 U/L (333-699)   02/07/18  11:05    


 


Total Creatine Kinase  56 U/L ()   02/07/18  11:05    


 


Troponin I  < 0.01 ng/mL  02/07/18  11:05    


 


Total Protein  7.0 g/dL (5.8-8.3)   02/08/18  06:30    


 


Albumin  3.7 g/dL (3.0-4.8)   02/08/18  06:30    


 


Globulin  3.4 gm/dL  02/08/18  06:30    


 


Albumin/Globulin Ratio  1.1  (1.1-1.8)   02/08/18  06:30    


 


Triglycerides  58 mg/dL ()   02/08/18  06:30    


 


Cholesterol  125 mg/dL (130-200)  L  02/08/18  06:30    


 


LDL Cholesterol Direct  61 mg/dL (0-129)   02/08/18  06:30    


 


HDL Cholesterol  39 mg/dL (29-60)   02/08/18  06:30    


 


Lipase  113 U/L ()   02/07/18  11:05    


 


Vitamin B12  498 pg/mL (239-931)   02/08/18  06:30    


 


Folate  7.0 ng/mL  02/08/18  06:30    


 


TSH 3rd Generation  5.44 mIU/mL (0.46-4.68)  H  02/08/18  06:30    


 


Urine Color  Yellow  (YELLOW)   02/07/18  11:05    


 


Urine Appearance  Clear  (CLEAR)   02/07/18  11:05    


 


Urine pH  6.5  (4.7-8.0)   02/07/18  11:05    


 


Ur Specific Gravity  1.010  (1.005-1.035)   02/07/18  11:05    


 


Urine Protein  Negative mg/dL (<30 mg/dL)   02/07/18  11:05    


 


Urine Glucose (UA)  Negative mg/dL (NEGATIVE)   02/07/18  11:05    


 


Urine Ketones  Negative mg/dL (NEGATIVE)   02/07/18  11:05    


 


Urine Blood  Negative  (NEGATIVE)   02/07/18  11:05    


 


Urine Nitrate  Negative  (NEGATIVE)   02/07/18  11:05    


 


Urine Bilirubin  Negative  (NEGATIVE)   02/07/18  11:05    


 


Urine Urobilinogen  0.2 E.U./dL (<1 E.U./dL)   02/07/18  11:05    


 


Ur Leukocyte Esterase  Negative Janice/uL (NEGATIVE)   02/07/18  11:05    


 


Blood Type  A POSITIVE   02/07/18  10:50    


 


Blood Type Confirm  A POSITIVE   02/07/18  12:45    


 


Antibody Screen  Negative   02/07/18  10:50    


 


BBK History Checked  No verified bt   02/07/18  10:50    














Discharge Exam





- Additional Findings


Additional findings: 


22 yr  female w/ history of ulcerative colitis, hemorrhoids, and 

anemia. Pt was admitted in [facility] [place] following an episode of bloody stools. Pt was 

followed by GI Dr. Mcneill. Colonscopy done with biopsies performed. IV 

treatment with IV flagyl/ceftriaxone per Infectious Disease. Continue 

outpatient treatment with prescribed Asacol & Rowasa. Pt cleared for discharge 

and should follow up in our office in 1 week for continuity of care. pt is seen 

and examined at bed side , looking comfortable , agreed all above , will f/u

## 2018-02-09 NOTE — CP.PCM.CON
History of Present Illness





- History of Present Illness


History of Present Illness: 


22 year old female with PMH of ulcerative colitis was initially admitted in [facility] [place] [facility] 

because of bloody stools and abdominal discomfort. She has no nausea or vomiting

, no chest pain, no SOB, no headache or dizziness, no dysuria, no sore throat, 

no cough or colds. The patient underwent colonoscopy which still showed the 

ulcers and inflammation from the rectum to the hepatic flexure. She had not 

been on ulcerative colitis treatment for the past 6 months. Prior to admission 

she had subjective fever and chills. Infectious Diseases consult is requested 

to further evaluate and manage.





Past Patient History





- Past Social History


Smoking Status: Never Smoked





- CARDIAC


Hx Cardiac Disorders: No





- PULMONARY


Hx Respiratory Disorders: No





- NEUROLOGICAL


Hx Neurological Disorder: No





- HEENT


Hx HEENT Problems: No





- RENAL


Hx Chronic Kidney Disease: No





- ENDOCRINE/METABOLIC


Hx Endocrine Disorders: No





- HEMATOLOGICAL/ONCOLOGICAL


Hx Blood Disorders: Yes


Hx Anemia: Yes





- INTEGUMENTARY


Hx Dermatological Problems: No





- MUSCULOSKELETAL/RHEUMATOLOGICAL


Hx Musculoskeletal Disorders: No


Hx Falls: No





- GASTROINTESTINAL


Hx Gastrointestinal Disorders: Yes (HEMORRHOIDS)


Other/Comment: COLITIS





- GENITOURINARY/GYNECOLOGICAL


Hx Genitourinary Disorders: No (OVARIAN CYSTS)





- PSYCHIATRIC


Hx Psychophysiologic Disorder: No





- SURGICAL HISTORY


Hx Surgeries: No





Meds


Allergies/Adverse Reactions: 


 Allergies











Allergy/AdvReac Type Severity Reaction Status Date / Time


 


codeine AdvReac  VOMITING Verified 02/07/18 17:07














- Medications


Medications: 


 Current Medications





Hydromorphone HCl (Dilaudid)  0.5 mg IVP Q4H PRN


   PRN Reason: Pain, Mild (1-3)


Sodium Chloride (Sodium Chloride 0.9%)  1,000 mls @ 150 mls/hr IV .Q6H40M [facility]


   Last Admin: 02/07/18 17:13 Dose:  150 mls/hr


Ondansetron HCl (Zofran Inj)  4 mg IVP Q4H PRN


   PRN Reason: Nausea/Vomiting


Pantoprazole Sodium (Protonix Inj)  40 mg IVP DAILY [facility]











Results





- Vital Signs


Recent Vital Signs: 


 Last Vital Signs











Temp  98.4 F   02/07/18 18:04


 


Pulse  70   02/07/18 18:04


 


Resp  17   02/07/18 18:04


 


BP  127/65   02/07/18 18:04


 


Pulse Ox  99   02/07/18 16:02














- Labs


Result Diagrams: 


 02/09/18 06:30





 02/09/18 06:30

## 2018-02-09 NOTE — CP.PCM.PN
<Sandie Mittal - Last Filed: 02/09/18 12:12>





Subjective





- Date & Time of Evaluation


Date of Evaluation: 02/09/18


Time of Evaluation: 08:00





- Subjective


Subjective: 


PGY4 GI Follow-up





Pt seen and examined bedside 


No complaints 


Still has diarrhea but improving


Tolerating diet 





ROS: 10 point ROS conducted, neg other than above =








Objective





- Vital Signs/Intake and Output


Vital Signs (last 24 hours): 


 











Temp Pulse Resp BP Pulse Ox


 


 98.5 F   81   18   102/56 L  99 


 


 02/09/18 08:00  02/09/18 08:00  02/09/18 08:00  02/09/18 08:00  02/09/18 08:00








Intake and Output: 


 











 02/09/18 02/09/18





 06:59 18:59


 


Intake Total 840 


 


Balance 840 














- Medications


Medications: 


 Current Medications





Metronidazole (Flagyl)  500 mg in 100 mls @ 100 mls/hr IVPB Q8 [facility]


   PRN Reason: Protocol


   Last Admin: 02/09/18 06:12 Dose:  100 mls/hr


Ceftriaxone Sodium (Rocephin 1 Gram Ivpb)  1 gm in 100 mls @ 100 mls/hr IVPB 

DAILY [facility]


   PRN Reason: Protocol


   Last Admin: 02/09/18 10:32 Dose:  100 mls/hr


Sodium Chloride (Sodium Chloride 0.9%)  1,000 mls @ 100 mls/hr IV .Q10H [facility]


   Last Admin: 02/08/18 21:36 Dose:  100 mls/hr


Mesalamine (Rowasa Enema)  4 gm RC BID [facility]


   Last Admin: 02/09/18 10:32 Dose:  4 gm


Mesalamine (Asacol Hd 800mg)  1,600 mg PO TID [facility]


   Last Admin: 02/09/18 10:31 Dose:  1,600 mg


Ondansetron HCl (Zofran Inj)  4 mg IVP Q4H PRN


   PRN Reason: Nausea/Vomiting


Pantoprazole Sodium (Protonix Inj)  40 mg IVP DAILY [facility]


   Last Admin: 02/09/18 10:32 Dose:  40 mg











- Labs


Labs: 


 





 02/09/18 06:30 





 02/09/18 06:30 





 











PT  12.0 SECONDS (9.4-12.5)   02/07/18  11:05    


 


INR  1.04  (0.93-1.08)   02/07/18  11:05    


 


APTT  22.7 Seconds (25.1-36.5)  L  02/07/18  11:05    














- Constitutional


Appears: Well, No Acute Distress





- Head Exam


Head Exam: ATRAUMATIC, NORMOCEPHALIC





- Eye Exam


Eye Exam: Normal appearance





- Respiratory Exam


Respiratory Exam: Clear to Ausculation Bilateral, NORMAL BREATHING PATTERN.  

absent: Rales, Rhonchi, Wheezes





- Cardiovascular Exam


Cardiovascular Exam: REGULAR RHYTHM, +S1, +S2





- GI/Abdominal Exam


GI & Abdominal Exam: Soft, Normal Bowel Sounds.  absent: Rigid, Tenderness, 

Organomegaly





- Extremities Exam


Extremities Exam: absent: Joint Swelling, Pedal Edema





- Neurological Exam


Neurological Exam: Alert, Awake, Oriented x3





- Psychiatric Exam


Psychiatric exam: Normal Affect, Normal Mood





- Skin


Skin Exam: Dry, Intact, Normal Color, Warm





Assessment and Plan





- Assessment and Plan (Free Text)


Assessment: 


Patient is a 21 y/o female with PMHx of ulcerative colitis diagnosed and c diff 

colitis s/p treatment 2x presenting with worsening in abdominal pain, bloody 

bowel movement and loose stool, along with fever. CT with ovarian cysts. s/p 

colonoscopy which revealed mucosal changes and ulceration from rectum to 

hepatic flexure, s/p biopsies and cultures





UC flare, r/o infectous etiology





Plan: 


- advance diet as tolerated 


-need stool culture and c.diff


-working on outpt medications 


-continue orak and rectal mesalaine 


-may evetually need biologics


-follow-up with Dr. Mcneill as oupt 


-okay to d/c from GI standpoint.





D/W Dr. Kerr








<Reginald Kerr Y - Last Filed: 02/09/18 12:46>





Objective





- Vital Signs/Intake and Output


Vital Signs (last 24 hours): 


 











Temp Pulse Resp BP Pulse Ox


 


 98.5 F   81   18   102/56 L  99 


 


 02/09/18 08:00  02/09/18 08:00  02/09/18 08:00  02/09/18 08:00  02/09/18 08:00








Intake and Output: 


 











 02/09/18 02/09/18





 06:59 18:59


 


Intake Total 840 


 


Balance 840 














- Medications


Medications: 


 Current Medications





Metronidazole (Flagyl)  500 mg in 100 mls @ 100 mls/hr IVPB Q8 [facility]


   PRN Reason: Protocol


   Last Admin: 02/09/18 06:12 Dose:  100 mls/hr


Ceftriaxone Sodium (Rocephin 1 Gram Ivpb)  1 gm in 100 mls @ 100 mls/hr IVPB 

DAILY [facility]


   PRN Reason: Protocol


   Last Admin: 02/09/18 10:32 Dose:  100 mls/hr


Sodium Chloride (Sodium Chloride 0.9%)  1,000 mls @ 100 mls/hr IV .Q10H [facility]


   Last Admin: 02/08/18 21:36 Dose:  100 mls/hr


Mesalamine (Rowasa Enema)  4 gm RC BID [facility]


   Last Admin: 02/09/18 10:32 Dose:  4 gm


Mesalamine (Asacol Hd 800mg)  1,600 mg PO TID [facility]


   Last Admin: 02/09/18 10:31 Dose:  1,600 mg


Ondansetron HCl (Zofran Inj)  4 mg IVP Q4H PRN


   PRN Reason: Nausea/Vomiting


Pantoprazole Sodium (Protonix Inj)  40 mg IVP DAILY [facility]


   Last Admin: 02/09/18 10:32 Dose:  40 mg











- Labs


Labs: 


 





 02/09/18 06:30 





 02/09/18 06:30 





 











PT  12.0 SECONDS (9.4-12.5)   02/07/18  11:05    


 


INR  1.04  (0.93-1.08)   02/07/18  11:05    


 


APTT  22.7 Seconds (25.1-36.5)  L  02/07/18  11:05    














Attending/Attestation





- Attestation


I have personally seen and examined this patient.: Yes


I have fully participated in the care of the patient.: Yes


I have reviewed all pertinent clinical information, including history, physical 

exam and plan: Yes


Notes (Text): 





02/09/18 12:39


I have seen and examined patient with GI fellow and medical resident.  No acute 

events overnight, she is seen sitting in chair, appears comfortable.  She 

denies abdominal pain, nausea, vomiting, fever/chills.  She had two loose 

watery bowel movements overnight and one this morning.  She otherwise is 

tolerating PO diet without difficulty.  Review of vitals from today are normal.





Ulcerative colitis


History of c-difficile colitis


Abdominal pain, rectal bleeding - s/p colonoscopy showing inflammation from 

rectum to hepatic flexure - normal appearing ileum





- Diet as tolerated


- Continue with oral and topical mesalamine therapy


- Follow up biopsies from colonoscopy


- Awaiting stool studies (specifically c-difficile given prior history and 

ongoing watery diarrhea)


- Patient has outpatient appointment with Dr. Mcneill scheduled for next week, 

from GI perspective ok to discharge home with subsequent follow up.  Patient 

has tentative plans to move to [place] in May, therefore specific outpatient 

regimen will need to be adjusted accordingly.

## 2018-02-12 ENCOUNTER — HOSPITAL ENCOUNTER (EMERGENCY)
Dept: HOSPITAL 42 - ED | Age: 23
Discharge: HOME | End: 2018-02-12
Payer: OTHER GOVERNMENT

## 2018-02-12 VITALS — OXYGEN SATURATION: 99 % | SYSTOLIC BLOOD PRESSURE: 102 MMHG | HEART RATE: 80 BPM | DIASTOLIC BLOOD PRESSURE: 76 MMHG

## 2018-02-12 VITALS — BODY MASS INDEX: 20.7 KG/M2

## 2018-02-12 VITALS — TEMPERATURE: 98.2 F | RESPIRATION RATE: 18 BRPM

## 2018-02-12 DIAGNOSIS — I80.8: Primary | ICD-10-CM

## 2018-02-12 NOTE — ED PDOC
Arrival/HPI





- General


Chief Complaint: Upper Extremity Problem/Injury


Time Seen by Provider: 02/12/18 15:39


Historian: Patient





- History of Present Illness


Narrative History of Present Illness (Text): 





02/12/18 15:42


This 23 yo female presents to this Emergency department complaining of  right 

arm pain x 3 days.  Patient stated she was hospitalized for a few days, and 

discharge 3 days.  Patient stated upon discharge from her last hospitalization, 

the area where IV was placed has been increasingly getting more painful.  

Patient denies other somatic complains.


Time/Duration: Other (see hpi)


Quality: Aching


Context: Other (last hopsital admission)





Past Medical History





- Provider Review


Nursing Documentation Reviewed: Yes





- Infectious Disease


Hx of Infectious Diseases: None





- Cardiac


Hx Cardiac Disorders: No





- Pulmonary


Hx Respiratory Disorders: No





- Neurological


Hx Neurological Disorder: No





- HEENT


Hx HEENT Disorder: No





- Renal


Hx Renal Disorder: No





- Endocrine/Metabolic


Hx Endocrine Disorders: No





- Hematological/Oncological


Hx Blood Disorders: Yes


Hx Anemia: Yes





- Integumentary


Hx Dermatological Disorder: No





- Musculoskeletal/Rheumatological


Hx Musculoskeletal Disorders: No


Hx Falls: No





- Gastrointestinal


Hx Gastrointestinal Disorders: Yes (HEMORRHOIDS)


Other/Comment: ulcerative colitis





- Genitourinary/Gynecological


Hx Genitourinary Disorders: No (OVARIAN CYSTS)





- Psychiatric


Hx Psychophysiologic Disorder: No


Hx Substance Use: No





- Anesthesia


Hx Anesthesia: Yes


Hx Anesthesia Reactions: No


Hx Malignant Hyperthermia: No





Family/Social History





- Physician Review


Nursing Documentation Reviewed: Yes


Family/Social History: Other (noncontributory)


Smoking Status: Never Smoked


Hx Alcohol Use: Yes


Hx Substance Use: No





Allergies/Home Meds


Allergies/Adverse Reactions: 


Allergies





codeine Adverse Reaction (Verified 02/07/18 17:07)


 VOMITING











Review of Systems





- Review of Systems


Constitutional: Normal.  absent: Fatigue, Weight Change, Fevers


Eyes: Normal


ENT: Normal


Respiratory: Normal.  absent: SOB, Cough


Cardiovascular: Normal


Gastrointestinal: Normal.  absent: Abdominal Pain, Nausea, Vomiting


Genitourinary Female: Normal


Musculoskeletal: Other (see hpi)


Skin: Normal.  absent: Rash, Pruritis, Skin Lesions, Laceration, Abscess, Ulcer

, Cellulitis


Neurological: Normal


Endocrine: Normal


Hemo/Lymphatic: Normal


Psychiatric: Normal





Physical Exam


Vital Signs











  Temp Pulse Resp BP Pulse Ox


 


 02/12/18 17:30   80  18  102/76  99


 


 02/12/18 15:50  98.2 F  86  18  100/66  94 L











Temperature: Afebrile


Blood Pressure: Normal


Pulse: Regular


Respiratory Rate: Normal


Appearance: Positive for: Well-Appearing, Non-Toxic, Comfortable


Pain Distress: None


Mental Status: Positive for: Alert and Oriented X 3





- Systems Exam


Head: Present: Atraumatic, Normocephalic


Pupils: Present: PERRL


Extroacular Muscles: Present: EOMI


Conjunctiva: Present: Normal


Mouth: Present: Moist Mucous Membranes


Neck: Present: Normal Range of Motion


Respiratory/Chest: Present: Clear to Auscultation, Good Air Exchange.  No: 

Respiratory Distress, Accessory Muscle Use


Back: Present: Normal Inspection


Upper Extremity: Present: Normal Inspection, NORMAL PULSES, Tenderness (Mild 

tenderness over rght anticubital area of right elbow.  No erythema or skin 

abscess.  No streaking erythema), Neurovascularly Intact, Capillary Refill < 2s

, Other (no induration, corlike mass, erythema, or skin rash).  No: Cyanosis, 

Edema, Normal ROM, Swelling, Erythema


Lower Extremity: Present: Normal Inspection, Normal ROM.  No: Edema


Neurological: Present: GCS=15, CN II-XII Intact, Speech Normal


Skin: Present: Warm, Dry, Normal Color.  No: Rashes


Psychiatric: Present: Alert, Oriented x 3, Normal Insight, Normal Concentration





Medical Decision Making


ED Course and Treatment: 





02/12/18 17:21


Re-evaluation.  Patient feels better.  Discussed results and plan with patient 

who expresses understanding.  All questions answered and there is agreement 

with the plan to discharge home with instructions.  Patient stable for 

discharge.  Return if symptoms persist or worsen.


Patient was recommended to apply warmth compress, and to take NSAIDs till 

symptoms improve.  She was also recommended that she will need a repeat 

ultrasound of right upper extremity if symptoms persist for more than 7 days.  

To return to emergency if symptoms worsen.


Re-evaluation Time: 17:21


Reassessment Condition: Re-examined, Improved





- RAD Interpretation


Narrative RAD Interpretations (Text): 





02/12/18 17:05





PROCEDURE:  Radiographs of the right elbow.





HISTORY:


pain r/o FB  





COMPARISON:


No prior.





FINDINGS:





BONES:


Normal. No fracture.





JOINTS:


Normal. No osteoarthritis.





SOFT TISSUES:


Normal.





JOINT EFFUSION:


None.





OTHER FINDINGS:


No evidence of radiopaque foreign body





IMPRESSION:


Unremarkable radiographs of the right elbow.











Upper extremity Venous Doppler:  no DVT as per US


Radiology Orders: 








02/12/18 15:40


DUPLEX UPPER EXTRM VEIN RIGHT [US] Stat 





02/12/18 15:41


ELBOW RIGHT 3 VIEWS ROUTINE [RAD] Stat 














- Medication Orders


Current Medication Orders: 











Discontinued Medications





Naproxen (Anaprox Ds)  550 mg PO STAT STA


   Stop: 02/12/18 15:41


   Last Admin: 02/12/18 15:47  Dose: 550 mg











Disposition/Present on Arrival





- Present on Arrival


Any Indicators Present on Arrival: No


History of DVT/PE: No


History of Uncontrolled Diabetes: No


Urinary Catheter: No


History of Decub. Ulcer: No


History Surgical Site Infection Following: None





- Disposition


Have Diagnosis and Disposition been Completed?: Yes


Diagnosis: 


 Phlebitis





Disposition: HOME/ ROUTINE


Disposition Time: 17:23


Patient Plan: Discharge


Condition: GOOD


Discharge Instructions (ExitCare):  Superficial Thrombophlebitis (ED)


Additional Instructions: 


Call private doctor for follow up visit in 1-2 days.  Take medication as 

instructed with food.  Use warmth compress for 10 minutes 4 times a day for 2-4 

days.  Return to emergency if symptoms worsen.  You will need a repeat 

ultrasound of your arm in 7 days if symptoms persist.


Prescriptions: 


Famotidine [Pepcid] 40 mg PO DAILY #10 tablet


Naproxen 500 mg PO BID PRN #12 tab


 PRN Reason: Pain, Severe (8-10)


Referrals: 


Usman Green DO [Primary Care Provider] - Follow up with primary


Forms:  [employer] (English)

## 2018-02-12 NOTE — US
PROCEDURE:  Right upper extremity venous US 



CLINICAL HISTORY:  Arm pain and swelling Evaluate for deep venous 

thrombosis.



PHYSICIAN(S):  Ed Russo M.D



FINDINGS:

The visualized rightinternal jugular vein is sonographically normal 

and compressible. No evidence of obstruction or thrombus is seen.



The visualized segments of the right subclavian vein are patent with 

normal waveforms. No sonographic evidence of obstruction or 

thrombosis is seen. 



The visualized deep venous system of the proximal right upper 

extremity is sonographically normal and compressible.



IMPRESSION:

1. No sonographic evidence for deep venous thrombosis in the 

visualized segments of the right upper extremity.

## 2018-02-12 NOTE — RAD
PROCEDURE:  Radiographs of the right elbow.



HISTORY:

pain r/o FB  



COMPARISON:

No prior.



FINDINGS:



BONES:

Normal. No fracture.



JOINTS:

Normal. No osteoarthritis.



SOFT TISSUES:

Normal.



JOINT EFFUSION:

None.



OTHER FINDINGS:

No evidence of radiopaque foreign body



IMPRESSION:

Unremarkable radiographs of the right elbow.